# Patient Record
Sex: FEMALE | Race: WHITE | NOT HISPANIC OR LATINO | Employment: OTHER | URBAN - METROPOLITAN AREA
[De-identification: names, ages, dates, MRNs, and addresses within clinical notes are randomized per-mention and may not be internally consistent; named-entity substitution may affect disease eponyms.]

---

## 2022-06-29 ENCOUNTER — HOSPITAL ENCOUNTER (OUTPATIENT)
Facility: HOSPITAL | Age: 72
Setting detail: OBSERVATION
Discharge: HOME | End: 2022-07-01
Attending: EMERGENCY MEDICINE | Admitting: INTERNAL MEDICINE
Payer: COMMERCIAL

## 2022-06-29 ENCOUNTER — NURSE TRIAGE (OUTPATIENT)
Dept: NURSING | Facility: CLINIC | Age: 72
End: 2022-06-29

## 2022-06-29 ENCOUNTER — APPOINTMENT (OUTPATIENT)
Dept: CT IMAGING | Facility: HOSPITAL | Age: 72
End: 2022-06-29
Attending: EMERGENCY MEDICINE
Payer: COMMERCIAL

## 2022-06-29 ENCOUNTER — APPOINTMENT (OUTPATIENT)
Dept: RADIOLOGY | Facility: HOSPITAL | Age: 72
End: 2022-06-29
Attending: EMERGENCY MEDICINE
Payer: COMMERCIAL

## 2022-06-29 DIAGNOSIS — K21.00 GASTROESOPHAGEAL REFLUX DISEASE WITH ESOPHAGITIS, UNSPECIFIED WHETHER HEMORRHAGE: Primary | ICD-10-CM

## 2022-06-29 DIAGNOSIS — R06.00 DYSPNEA, UNSPECIFIED TYPE: ICD-10-CM

## 2022-06-29 DIAGNOSIS — R06.09 DOE (DYSPNEA ON EXERTION): ICD-10-CM

## 2022-06-29 DIAGNOSIS — I16.0 HYPERTENSIVE URGENCY: ICD-10-CM

## 2022-06-29 DIAGNOSIS — R06.01 ORTHOPNEA: ICD-10-CM

## 2022-06-29 LAB
ALBUMIN SERPL-MCNC: 3.5 G/DL (ref 3.5–5)
ALP SERPL-CCNC: 90 U/L (ref 45–120)
ALT SERPL W P-5'-P-CCNC: 49 U/L (ref 0–45)
ANION GAP SERPL CALCULATED.3IONS-SCNC: 10 MMOL/L (ref 5–18)
APTT PPP: 32 SECONDS (ref 22–38)
AST SERPL W P-5'-P-CCNC: 84 U/L (ref 0–40)
BASOPHILS # BLD AUTO: 0 10E3/UL (ref 0–0.2)
BASOPHILS NFR BLD AUTO: 1 %
BILIRUB SERPL-MCNC: 0.5 MG/DL (ref 0–1)
BNP SERPL-MCNC: 155 PG/ML (ref 0–117)
BUN SERPL-MCNC: 19 MG/DL (ref 8–22)
CALCIUM SERPL-MCNC: 8.8 MG/DL (ref 8.5–10.5)
CHLORIDE BLD-SCNC: 104 MMOL/L (ref 98–107)
CO2 SERPL-SCNC: 22 MMOL/L (ref 22–31)
CREAT SERPL-MCNC: 1.2 MG/DL (ref 0.6–1.1)
D DIMER PPP FEU-MCNC: 2.98 UG/ML FEU (ref 0–0.5)
EOSINOPHIL # BLD AUTO: 0 10E3/UL (ref 0–0.7)
EOSINOPHIL NFR BLD AUTO: 0 %
ERYTHROCYTE [DISTWIDTH] IN BLOOD BY AUTOMATED COUNT: 13 % (ref 10–15)
GFR SERPL CREATININE-BSD FRML MDRD: 49 ML/MIN/1.73M2
GLUCOSE BLD-MCNC: 108 MG/DL (ref 70–125)
HCT VFR BLD AUTO: 37.5 % (ref 35–47)
HGB BLD-MCNC: 12.6 G/DL (ref 11.7–15.7)
IMM GRANULOCYTES # BLD: 0 10E3/UL
IMM GRANULOCYTES NFR BLD: 0 %
INR PPP: 1.02 (ref 0.85–1.15)
LYMPHOCYTES # BLD AUTO: 0.8 10E3/UL (ref 0.8–5.3)
LYMPHOCYTES NFR BLD AUTO: 17 %
MCH RBC QN AUTO: 31.2 PG (ref 26.5–33)
MCHC RBC AUTO-ENTMCNC: 33.6 G/DL (ref 31.5–36.5)
MCV RBC AUTO: 93 FL (ref 78–100)
MONOCYTES # BLD AUTO: 0.3 10E3/UL (ref 0–1.3)
MONOCYTES NFR BLD AUTO: 7 %
NEUTROPHILS # BLD AUTO: 3.5 10E3/UL (ref 1.6–8.3)
NEUTROPHILS NFR BLD AUTO: 75 %
NRBC # BLD AUTO: 0 10E3/UL
NRBC BLD AUTO-RTO: 0 /100
PLATELET # BLD AUTO: 186 10E3/UL (ref 150–450)
POTASSIUM BLD-SCNC: 4 MMOL/L (ref 3.5–5)
PROT SERPL-MCNC: 7.2 G/DL (ref 6–8)
RBC # BLD AUTO: 4.04 10E6/UL (ref 3.8–5.2)
SARS-COV-2 RNA RESP QL NAA+PROBE: NEGATIVE
SODIUM SERPL-SCNC: 136 MMOL/L (ref 136–145)
TROPONIN I SERPL-MCNC: 0.01 NG/ML (ref 0–0.29)
WBC # BLD AUTO: 4.7 10E3/UL (ref 4–11)

## 2022-06-29 PROCEDURE — 84484 ASSAY OF TROPONIN QUANT: CPT | Performed by: EMERGENCY MEDICINE

## 2022-06-29 PROCEDURE — 80053 COMPREHEN METABOLIC PANEL: CPT | Performed by: EMERGENCY MEDICINE

## 2022-06-29 PROCEDURE — 85025 COMPLETE CBC W/AUTO DIFF WBC: CPT | Performed by: EMERGENCY MEDICINE

## 2022-06-29 PROCEDURE — 96375 TX/PRO/DX INJ NEW DRUG ADDON: CPT

## 2022-06-29 PROCEDURE — 93005 ELECTROCARDIOGRAM TRACING: CPT | Performed by: EMERGENCY MEDICINE

## 2022-06-29 PROCEDURE — C9803 HOPD COVID-19 SPEC COLLECT: HCPCS

## 2022-06-29 PROCEDURE — 71045 X-RAY EXAM CHEST 1 VIEW: CPT

## 2022-06-29 PROCEDURE — 85610 PROTHROMBIN TIME: CPT | Performed by: EMERGENCY MEDICINE

## 2022-06-29 PROCEDURE — 36415 COLL VENOUS BLD VENIPUNCTURE: CPT | Performed by: EMERGENCY MEDICINE

## 2022-06-29 PROCEDURE — 82040 ASSAY OF SERUM ALBUMIN: CPT | Performed by: EMERGENCY MEDICINE

## 2022-06-29 PROCEDURE — 250N000013 HC RX MED GY IP 250 OP 250 PS 637: Performed by: EMERGENCY MEDICINE

## 2022-06-29 PROCEDURE — 99285 EMERGENCY DEPT VISIT HI MDM: CPT | Mod: CS,25

## 2022-06-29 PROCEDURE — 85379 FIBRIN DEGRADATION QUANT: CPT | Performed by: EMERGENCY MEDICINE

## 2022-06-29 PROCEDURE — 83880 ASSAY OF NATRIURETIC PEPTIDE: CPT | Performed by: EMERGENCY MEDICINE

## 2022-06-29 PROCEDURE — 85730 THROMBOPLASTIN TIME PARTIAL: CPT | Performed by: EMERGENCY MEDICINE

## 2022-06-29 PROCEDURE — 96374 THER/PROPH/DIAG INJ IV PUSH: CPT

## 2022-06-29 PROCEDURE — 87635 SARS-COV-2 COVID-19 AMP PRB: CPT | Performed by: EMERGENCY MEDICINE

## 2022-06-29 PROCEDURE — 71275 CT ANGIOGRAPHY CHEST: CPT

## 2022-06-29 RX ORDER — NITROGLYCERIN 80 MG/1
1 PATCH TRANSDERMAL ONCE
Status: COMPLETED | OUTPATIENT
Start: 2022-06-29 | End: 2022-06-30

## 2022-06-29 RX ORDER — ASPIRIN 325 MG
325 TABLET ORAL ONCE
Status: COMPLETED | OUTPATIENT
Start: 2022-06-29 | End: 2022-06-29

## 2022-06-29 RX ADMIN — NITROGLYCERIN 1 PATCH: 0.4 PATCH TRANSDERMAL at 23:09

## 2022-06-29 RX ADMIN — ASPIRIN 325 MG: 325 TABLET ORAL at 23:06

## 2022-06-29 ASSESSMENT — ENCOUNTER SYMPTOMS
SHORTNESS OF BREATH: 1
NAUSEA: 1
ABDOMINAL PAIN: 1
ROS GI COMMENTS: POSITIVE FOR DRY HEAVING.

## 2022-06-29 NOTE — TELEPHONE ENCOUNTER
"Pt reports [facility] PCP is out of office and covering provider will not refill her pain medication. Tramadol for chronic leg pain. Pt also reports dental pain also and states \"my dentist won't give me anything\".     Pt is requesting to establish care with provider who was recommended to her by another pt.     Writer advised pt if she is having severe pain or it is an emergency she will need to be seen in ER.     Pt verbalizes understanding and agrees to plan.     Reason for Disposition    Requesting regular office appointment    Protocols used: INFORMATION ONLY CALL - NO TRIAGE-A-AH      "

## 2022-06-30 ENCOUNTER — APPOINTMENT (OUTPATIENT)
Dept: OCCUPATIONAL THERAPY | Facility: HOSPITAL | Age: 72
End: 2022-06-30
Attending: INTERNAL MEDICINE
Payer: COMMERCIAL

## 2022-06-30 ENCOUNTER — APPOINTMENT (OUTPATIENT)
Dept: CARDIOLOGY | Facility: HOSPITAL | Age: 72
End: 2022-06-30
Attending: INTERNAL MEDICINE
Payer: COMMERCIAL

## 2022-06-30 ENCOUNTER — APPOINTMENT (OUTPATIENT)
Dept: PHYSICAL THERAPY | Facility: HOSPITAL | Age: 72
End: 2022-06-30
Attending: INTERNAL MEDICINE
Payer: COMMERCIAL

## 2022-06-30 PROBLEM — I16.0 HYPERTENSIVE URGENCY: Status: ACTIVE | Noted: 2022-06-30

## 2022-06-30 PROBLEM — R06.09 DOE (DYSPNEA ON EXERTION): Status: ACTIVE | Noted: 2022-06-30

## 2022-06-30 PROBLEM — R06.00 DYSPNEA, UNSPECIFIED TYPE: Status: ACTIVE | Noted: 2022-06-30

## 2022-06-30 PROBLEM — R06.01 ORTHOPNEA: Status: ACTIVE | Noted: 2022-06-30

## 2022-06-30 LAB
ALBUMIN SERPL-MCNC: 3.1 G/DL (ref 3.5–5)
ALBUMIN UR-MCNC: 20 MG/DL
ALP SERPL-CCNC: 87 U/L (ref 45–120)
ALT SERPL W P-5'-P-CCNC: 48 U/L (ref 0–45)
ANION GAP SERPL CALCULATED.3IONS-SCNC: 7 MMOL/L (ref 5–18)
APPEARANCE UR: CLEAR
AST SERPL W P-5'-P-CCNC: 74 U/L (ref 0–40)
ATRIAL RATE - MUSE: 58 BPM
BILIRUB SERPL-MCNC: 0.4 MG/DL (ref 0–1)
BILIRUB UR QL STRIP: NEGATIVE
BUN SERPL-MCNC: 18 MG/DL (ref 8–22)
CALCIUM SERPL-MCNC: 8.5 MG/DL (ref 8.5–10.5)
CHLORIDE BLD-SCNC: 103 MMOL/L (ref 98–107)
CO2 SERPL-SCNC: 26 MMOL/L (ref 22–31)
COLOR UR AUTO: ABNORMAL
CREAT SERPL-MCNC: 1.18 MG/DL (ref 0.6–1.1)
DIASTOLIC BLOOD PRESSURE - MUSE: NORMAL MMHG
ERYTHROCYTE [DISTWIDTH] IN BLOOD BY AUTOMATED COUNT: 13 % (ref 10–15)
GFR SERPL CREATININE-BSD FRML MDRD: 49 ML/MIN/1.73M2
GLUCOSE BLD-MCNC: 127 MG/DL (ref 70–125)
GLUCOSE UR STRIP-MCNC: NEGATIVE MG/DL
HCT VFR BLD AUTO: 35.9 % (ref 35–47)
HGB BLD-MCNC: 11.7 G/DL (ref 11.7–15.7)
HGB UR QL STRIP: NEGATIVE
INTERPRETATION ECG - MUSE: NORMAL
KETONES UR STRIP-MCNC: NEGATIVE MG/DL
LACTATE SERPL-SCNC: 1 MMOL/L (ref 0.7–2)
LEUKOCYTE ESTERASE UR QL STRIP: NEGATIVE
LVEF ECHO: NORMAL
MCH RBC QN AUTO: 31 PG (ref 26.5–33)
MCHC RBC AUTO-ENTMCNC: 32.6 G/DL (ref 31.5–36.5)
MCV RBC AUTO: 95 FL (ref 78–100)
MUCOUS THREADS #/AREA URNS LPF: PRESENT /LPF
NITRATE UR QL: NEGATIVE
P AXIS - MUSE: 37 DEGREES
PH UR STRIP: 5 [PH] (ref 5–7)
PLATELET # BLD AUTO: 175 10E3/UL (ref 150–450)
POTASSIUM BLD-SCNC: 4 MMOL/L (ref 3.5–5)
PR INTERVAL - MUSE: 144 MS
PROT SERPL-MCNC: 6.8 G/DL (ref 6–8)
QRS DURATION - MUSE: 72 MS
QT - MUSE: 408 MS
QTC - MUSE: 400 MS
R AXIS - MUSE: 12 DEGREES
RBC # BLD AUTO: 3.77 10E6/UL (ref 3.8–5.2)
RBC URINE: 0 /HPF
SODIUM SERPL-SCNC: 136 MMOL/L (ref 136–145)
SP GR UR STRIP: 1.03 (ref 1–1.03)
SQUAMOUS EPITHELIAL: 1 /HPF
SYSTOLIC BLOOD PRESSURE - MUSE: NORMAL MMHG
T AXIS - MUSE: 56 DEGREES
TROPONIN I SERPL-MCNC: <0.01 NG/ML (ref 0–0.29)
TROPONIN I SERPL-MCNC: <0.01 NG/ML (ref 0–0.29)
UROBILINOGEN UR STRIP-MCNC: <2 MG/DL
VENTRICULAR RATE- MUSE: 58 BPM
WBC # BLD AUTO: 3.9 10E3/UL (ref 4–11)
WBC URINE: 0 /HPF

## 2022-06-30 PROCEDURE — 255N000002 HC RX 255 OP 636: Performed by: INTERNAL MEDICINE

## 2022-06-30 PROCEDURE — 84484 ASSAY OF TROPONIN QUANT: CPT | Performed by: INTERNAL MEDICINE

## 2022-06-30 PROCEDURE — 97162 PT EVAL MOD COMPLEX 30 MIN: CPT | Mod: GP | Performed by: PHYSICAL THERAPIST

## 2022-06-30 PROCEDURE — 250N000011 HC RX IP 250 OP 636: Performed by: INTERNAL MEDICINE

## 2022-06-30 PROCEDURE — 81001 URINALYSIS AUTO W/SCOPE: CPT | Performed by: INTERNAL MEDICINE

## 2022-06-30 PROCEDURE — 93306 TTE W/DOPPLER COMPLETE: CPT | Mod: 26 | Performed by: INTERNAL MEDICINE

## 2022-06-30 PROCEDURE — 97166 OT EVAL MOD COMPLEX 45 MIN: CPT | Mod: GO

## 2022-06-30 PROCEDURE — 250N000013 HC RX MED GY IP 250 OP 250 PS 637: Performed by: INTERNAL MEDICINE

## 2022-06-30 PROCEDURE — G0378 HOSPITAL OBSERVATION PER HR: HCPCS

## 2022-06-30 PROCEDURE — 250N000013 HC RX MED GY IP 250 OP 250 PS 637: Performed by: EMERGENCY MEDICINE

## 2022-06-30 PROCEDURE — 85027 COMPLETE CBC AUTOMATED: CPT | Performed by: INTERNAL MEDICINE

## 2022-06-30 PROCEDURE — 250N000011 HC RX IP 250 OP 636: Performed by: EMERGENCY MEDICINE

## 2022-06-30 PROCEDURE — 97535 SELF CARE MNGMENT TRAINING: CPT | Mod: GO

## 2022-06-30 PROCEDURE — 999N000208 ECHOCARDIOGRAM COMPLETE

## 2022-06-30 PROCEDURE — 83605 ASSAY OF LACTIC ACID: CPT | Performed by: INTERNAL MEDICINE

## 2022-06-30 PROCEDURE — 36415 COLL VENOUS BLD VENIPUNCTURE: CPT | Performed by: INTERNAL MEDICINE

## 2022-06-30 PROCEDURE — 99220 PR INITIAL OBSERVATION CARE,LEVEL III: CPT | Performed by: INTERNAL MEDICINE

## 2022-06-30 PROCEDURE — C9113 INJ PANTOPRAZOLE SODIUM, VIA: HCPCS | Performed by: INTERNAL MEDICINE

## 2022-06-30 PROCEDURE — 97116 GAIT TRAINING THERAPY: CPT | Mod: GP | Performed by: PHYSICAL THERAPIST

## 2022-06-30 PROCEDURE — 80053 COMPREHEN METABOLIC PANEL: CPT | Performed by: INTERNAL MEDICINE

## 2022-06-30 RX ORDER — HYDRALAZINE HYDROCHLORIDE 20 MG/ML
10 INJECTION INTRAMUSCULAR; INTRAVENOUS EVERY 6 HOURS PRN
Status: DISCONTINUED | OUTPATIENT
Start: 2022-06-30 | End: 2022-07-01 | Stop reason: HOSPADM

## 2022-06-30 RX ORDER — AMOXICILLIN 250 MG
1-2 CAPSULE ORAL 2 TIMES DAILY PRN
Status: DISCONTINUED | OUTPATIENT
Start: 2022-06-30 | End: 2022-07-01 | Stop reason: HOSPADM

## 2022-06-30 RX ORDER — LISINOPRIL 5 MG/1
10 TABLET ORAL DAILY
Status: DISCONTINUED | OUTPATIENT
Start: 2022-06-30 | End: 2022-07-01 | Stop reason: HOSPADM

## 2022-06-30 RX ORDER — ATENOLOL 25 MG/1
25 TABLET ORAL DAILY
COMMUNITY

## 2022-06-30 RX ORDER — FUROSEMIDE 10 MG/ML
40 INJECTION INTRAMUSCULAR; INTRAVENOUS ONCE
Status: COMPLETED | OUTPATIENT
Start: 2022-06-30 | End: 2022-06-30

## 2022-06-30 RX ORDER — LIDOCAINE 40 MG/G
CREAM TOPICAL
Status: DISCONTINUED | OUTPATIENT
Start: 2022-06-30 | End: 2022-07-01 | Stop reason: HOSPADM

## 2022-06-30 RX ORDER — IOPAMIDOL 755 MG/ML
75 INJECTION, SOLUTION INTRAVASCULAR ONCE
Status: COMPLETED | OUTPATIENT
Start: 2022-06-30 | End: 2022-06-30

## 2022-06-30 RX ORDER — ATENOLOL 25 MG/1
25 TABLET ORAL DAILY
Status: DISCONTINUED | OUTPATIENT
Start: 2022-06-30 | End: 2022-07-01 | Stop reason: HOSPADM

## 2022-06-30 RX ORDER — ACETAMINOPHEN 325 MG/1
650 TABLET ORAL ONCE
Status: COMPLETED | OUTPATIENT
Start: 2022-06-30 | End: 2022-06-30

## 2022-06-30 RX ORDER — GABAPENTIN 300 MG/1
300 CAPSULE ORAL
Status: DISCONTINUED | OUTPATIENT
Start: 2022-06-30 | End: 2022-07-01 | Stop reason: HOSPADM

## 2022-06-30 RX ORDER — PANTOPRAZOLE SODIUM 40 MG/1
40 TABLET, DELAYED RELEASE ORAL
Status: DISCONTINUED | OUTPATIENT
Start: 2022-07-01 | End: 2022-07-01 | Stop reason: HOSPADM

## 2022-06-30 RX ORDER — TRAMADOL HYDROCHLORIDE 50 MG/1
50 TABLET ORAL EVERY 6 HOURS PRN
Status: DISCONTINUED | OUTPATIENT
Start: 2022-06-30 | End: 2022-07-01 | Stop reason: HOSPADM

## 2022-06-30 RX ORDER — TRAMADOL HYDROCHLORIDE 50 MG/1
50 TABLET ORAL EVERY 6 HOURS PRN
COMMUNITY

## 2022-06-30 RX ORDER — ONDANSETRON 2 MG/ML
4 INJECTION INTRAMUSCULAR; INTRAVENOUS EVERY 6 HOURS PRN
Status: DISCONTINUED | OUTPATIENT
Start: 2022-06-30 | End: 2022-07-01 | Stop reason: HOSPADM

## 2022-06-30 RX ORDER — LISINOPRIL 10 MG/1
10 TABLET ORAL DAILY
COMMUNITY

## 2022-06-30 RX ORDER — SIMVASTATIN 10 MG
20 TABLET ORAL AT BEDTIME
Status: DISCONTINUED | OUTPATIENT
Start: 2022-06-30 | End: 2022-07-01 | Stop reason: HOSPADM

## 2022-06-30 RX ADMIN — PANTOPRAZOLE SODIUM 40 MG: 40 INJECTION, POWDER, FOR SOLUTION INTRAVENOUS at 11:08

## 2022-06-30 RX ADMIN — SIMVASTATIN 20 MG: 10 TABLET, FILM COATED ORAL at 20:28

## 2022-06-30 RX ADMIN — ACETAMINOPHEN 650 MG: 325 TABLET ORAL at 01:09

## 2022-06-30 RX ADMIN — LISINOPRIL 10 MG: 5 TABLET ORAL at 14:04

## 2022-06-30 RX ADMIN — FUROSEMIDE 40 MG: 10 INJECTION, SOLUTION INTRAMUSCULAR; INTRAVENOUS at 00:42

## 2022-06-30 RX ADMIN — PERFLUTREN 2 ML: 6.52 INJECTION, SUSPENSION INTRAVENOUS at 09:59

## 2022-06-30 RX ADMIN — TRAMADOL HYDROCHLORIDE 50 MG: 50 TABLET ORAL at 23:54

## 2022-06-30 RX ADMIN — TRAMADOL HYDROCHLORIDE 25 MG: 50 TABLET ORAL at 02:37

## 2022-06-30 RX ADMIN — IOPAMIDOL 75 ML: 755 INJECTION, SOLUTION INTRAVENOUS at 00:12

## 2022-06-30 RX ADMIN — ATENOLOL 25 MG: 25 TABLET ORAL at 14:04

## 2022-06-30 RX ADMIN — TRAMADOL HYDROCHLORIDE 50 MG: 50 TABLET ORAL at 13:11

## 2022-06-30 ASSESSMENT — ACTIVITIES OF DAILY LIVING (ADL)
DIFFICULTY_COMMUNICATING: NO
DOING_ERRANDS_INDEPENDENTLY_DIFFICULTY: YES
WALKING_OR_CLIMBING_STAIRS_DIFFICULTY: YES
DIFFICULTY_EATING/SWALLOWING: NO
WALKING_OR_CLIMBING_STAIRS: STAIR CLIMBING DIFFICULTY, REQUIRES EQUIPMENT
EQUIPMENT_CURRENTLY_USED_AT_HOME: CANE, STRAIGHT
ADLS_ACUITY_SCORE: 35
CHANGE_IN_FUNCTIONAL_STATUS_SINCE_ONSET_OF_CURRENT_ILLNESS/INJURY: YES
IADL_COMMENTS: IND FOR IADLS, DRIVES, MEDS
TRANSFERRING: 0-->INDEPENDENT
CONCENTRATING,_REMEMBERING_OR_MAKING_DECISIONS_DIFFICULTY: NO
ADLS_ACUITY_SCORE: 35
DRESSING/BATHING_DIFFICULTY: NO
DEPENDENT_IADLS:: INDEPENDENT
FALL_HISTORY_WITHIN_LAST_SIX_MONTHS: YES
TRANSFERRING: 0-->INDEPENDENT
ADLS_ACUITY_SCORE: 35
ADLS_ACUITY_SCORE: 35
TOILETING_ISSUES: NO
WEAR_GLASSES_OR_BLIND: YES
HEARING_DIFFICULTY_OR_DEAF: NO
ADLS_ACUITY_SCORE: 35
NUMBER_OF_TIMES_PATIENT_HAS_FALLEN_WITHIN_LAST_SIX_MONTHS: 2

## 2022-06-30 NOTE — ED NOTES
Finished 3/4 of food ordered for breakfast.  Dr. Lynn informed of pt's chronic right leg pain and awaiting for med orders

## 2022-06-30 NOTE — PLAN OF CARE
"  Problem: Plan of Care - These are the overarching goals to be used throughout the patient stay.    Goal: Plan of Care Review/Shift Note  Description: The Plan of Care Review/Shift note should be completed every shift.  The Outcome Evaluation is a brief statement about your assessment that the patient is improving, declining, or no change.  This information will be displayed automatically on your shift note.  Outcome: Ongoing, Progressing  Goal: Patient-Specific Goal (Individualized)  Description: You can add care plan individualizations to a care plan. Examples of Individualization might be:  \"Parent requests to be called daily at 9am for status\", \"I have a hard time hearing out of my right ear\", or \"Do not touch me to wake me up as it startles me\".  Outcome: Ongoing, Progressing  Goal: Absence of Hospital-Acquired Illness or Injury  Outcome: Ongoing, Progressing  Intervention: Identify and Manage Fall Risk  Recent Flowsheet Documentation  Taken 6/30/2022 1659 by Christie Zhou, RN  Safety Promotion/Fall Prevention:    room door open    lighting adjusted    mobility aid in reach    nonskid shoes/slippers when out of bed  Intervention: Prevent and Manage VTE (Venous Thromboembolism) Risk  Recent Flowsheet Documentation  Taken 6/30/2022 1659 by Christie Zhou, RN  Activity Management:    activity adjusted per tolerance    activity encouraged    bedrest    up ad thelma  Goal: Optimal Comfort and Wellbeing  Outcome: Ongoing, Progressing     Problem: Risk for Delirium  Goal: Optimal Coping  Outcome: Ongoing, Progressing  Goal: Improved Attention and Thought Clarity  Outcome: Ongoing, Progressing   Goal Outcome Evaluation:  Transfer in from the Emergency room  with complaints taken as SOB with activity  noted to have 3+ lower extremity edema ,lung sounds with crackles  severe yeast infection of the traci area, Placed on the telemetry and reading showed NSR.                    "

## 2022-06-30 NOTE — CONSULTS
Care Management Initial Consult    General Information  Assessment completed with: Patient, pt  Type of CM/SW Visit: Initial Assessment    Primary Care Provider verified and updated as needed: Yes   Readmission within the last 30 days: no previous admission in last 30 days   Return Category: Progression of disease  Reason for Consult: discharge planning  Advance Care Planning: Advance Care Planning Reviewed: no concerns identified     General Information Comments: lives alone in [facility] and drove here, no family around but has supportive friends    Communication Assessment  Patient's communication style: spoken language (English or Bilingual)             Cognitive  Cognitive/Neuro/Behavioral: WDL                      Living Environment:   People in home: alone     Current living Arrangements: other (see comments) ([facility])      Able to return to prior arrangements: yes       Family/Social Support:  Care provided by: self  Provides care for: no one  Marital Status: Single  Other (specify) ([facility])          Description of Support System: Supportive, Involved    Support Assessment: Adequate social supports, Adequate family and caregiver support    Current Resources:   Patient receiving home care services: No     Community Resources: DME  Equipment currently used at home: cane, straight, shower chair  Supplies currently used at home: Other (glasses)    Employment/Financial:  Employment Status:          Financial Concerns: No concerns identified   Referral to Financial Worker: No       Lifestyle & Psychosocial Needs:  Social Determinants of Health     Tobacco Use: Unknown     Smoking Tobacco Use: Never Smoker     Smokeless Tobacco Use: Unknown   Alcohol Use: Not on file   Financial Resource Strain: Not on file   Food Insecurity: Not on file   Transportation Needs: Not on file   Physical Activity: Not on file   Stress: Not on file   Social Connections: Not on file   Intimate Partner Violence: Not on file   Depression:  Not on file   Housing Stability: Not on file       Functional Status:  Prior to admission patient needed assistance:   Dependent ADLs:: Independent  Dependent IADLs:: Independent  Assesssment of Functional Status: Not at baseline with ADL Functioning    Mental Health Status:  Mental Health Status: No Current Concerns       Chemical Dependency Status:                Values/Beliefs:  Spiritual, Cultural Beliefs, Latter-day Practices, Values that affect care:                 Additional Information:  Assessed, lives alone in [facility] and drove here, no family around but has supportive friends. Discussed MOON/[email or web address].       Inocente Owens RN

## 2022-06-30 NOTE — ED PROVIDER NOTES
NAME: Bonnie Hill  AGE: 71 year old female  YOB: 1950  MRN: [id number]  EVALUATION DATE & TIME: 6/29/2022 10:08 PM    PCP: No primary care provider on file.    ED PROVIDER: Mati Schaeffer M.D.      Chief Complaint   Patient presents with     Shortness of Breath     Abdominal Pain     FINAL IMPRESSION:  1. BURROUGHS (dyspnea on exertion)    2. Orthopnea    3. Dyspnea, unspecified type    4. Hypertensive urgency      MEDICAL DECISION MAKING:    10:27 PM I met with the patient, obtained history, performed an initial exam, and discussed options and plan for diagnostics and treatment here in the ED.   12:45 AM Checked in on and updated patient.   12:57 AM Informed by nurse that patient is now endorsing dental pain and would like Tylenol for this. Will place an order.   1:07 AM Spoke with the hospitalist, Dr. Stevens.      Patient was clinically assessed and consented to treatment. After assessment, medical decision making and workup were discussed with the patient. The patient was agreeable to plan for testing, workup, and treatment.  Pertinent Labs & Imaging studies reviewed. (See chart for details)     Bonnie Hill is a 71 year old female who presents with breath and abdominal pain.   Differential diagnosis includes but not limited to CHF exacerbation, pulm embolism, acute coronary syndrome, pneumonia, pulmonary hypertension, hypertensive emergency, hypertensive urgency.  Patient with ongoing progressive worsening dyspnea on exertion, orthopnea, and bilateral crackles suspicious for pulmonary edema and likely CHF.  Patient does have high blood pressure that is poorly controlled and likely some component of hypertensive urgency causing some cardiac strain.  EKG showed no ST elevation ischemia.  Troponin was negative and BNP slightly elevated.  Patient's metabolic panel and CBC unremarkable and D-dimer did return slightly elevated.  Patient would be concern for PE given the shortness of breath  however given the slow progression I suspect more so pulmonary edema and will proceed with CTA for rule out.  Chest x-ray did show possible infiltrate but unlikely pneumonia as this is been slowly progressive and I feel more so that is probably atelectasis and pulmonary edema.  CTA was negative for pulmonary embolism.  Patient given Lasix IV and nitro patch placed for blood pressure control.  Patient's blood pressure did come down from 200s to 170s where she states she normally is at with her home blood pressure medications.  Patient will require better blood pressure control as well as cardiac rule out and evaluation for suspected CHF.  Patient was placed on cardiac telemetry and was discussed with hospitalist for admission.    0 minutes of critical care time    MEDICATIONS GIVEN IN THE EMERGENCY:  Medications   nitroGLYcerin (NITRODUR) 0.4 MG/HR 24 hr patch 1 patch (1 patch Transdermal Patch/Med Applied 6/29/22 2309)   aspirin (ASA) tablet 325 mg (325 mg Oral Given 6/29/22 2306)   iopamidol (ISOVUE-370) solution 75 mL (75 mLs Intravenous Given 6/30/22 0012)   furosemide (LASIX) injection 40 mg (40 mg Intravenous Given 6/30/22 0042)   acetaminophen (TYLENOL) tablet 650 mg (650 mg Oral Given 6/30/22 0109)   traMADol (ULTRAM) half-tab 25 mg (25 mg Oral Given 6/30/22 0237)       NEW PRESCRIPTIONS STARTED AT TODAY'S ER VISIT:  New Prescriptions    No medications on file          =================================================================    HPI    Patient information was obtained from: patient     Use of : N/A       Bonniececy Hill is a 71 year old female with a past medical history of hypertension, hyperlipidemia, and lymphedema who presents for shortness of breath.     Per triage note, patient presented for 2 days (6/27/2022) of nausea, dry heaving, and abdominal pain.     Per patient, she has felt exertional shortness of breath for the past couple years. States she cannot walk 50 feet without  feeling short of breath. Reports she feels better sitting up than laying down. She endorse having chronic lower extremity edema. She reports a history of hypertension and is taking 2 blood pressure medications which she takes regularly. States her normal blood pressure is 175. Denies seeing her primary care provider for her shortness of breath. Patient denies chest pain or any other complaints at this time.     REVIEW OF SYSTEMS   Review of Systems   Respiratory: Positive for shortness of breath.    Cardiovascular: Positive for leg swelling (bilateral, chronic). Negative for chest pain.   Gastrointestinal: Positive for abdominal pain and nausea.        Positive for dry heaving.   All other systems reviewed and are negative.       PAST MEDICAL HISTORY:  No past medical history on file.    PAST SURGICAL HISTORY:  Past Surgical History:   Procedure Laterality Date     OTHER SURGICAL HISTORY  2012    left TKArevision in 2013     RELEASE CARPAL TUNNEL Bilateral 1990     RIGHT OOPHORECTOMY         CURRENT MEDICATIONS:      Current Facility-Administered Medications:      nitroGLYcerin (NITRODUR) 0.4 MG/HR 24 hr patch 1 patch, 1 patch, Transdermal, Once, WallMati MD, 1 patch at 06/29/22 8966    Current Outpatient Medications:      capsicum (ZOSTRIX) 0.075 % topical cream, [CAPSICUM (ZOSTRIX) 0.075 % TOPICAL CREAM] Apply 1 application topically 2 (two) times a day as needed., Disp: , Rfl:      gabapentin (NEURONTIN) 300 MG capsule, [GABAPENTIN (NEURONTIN) 300 MG CAPSULE] Take 300 mg by mouth bedtime as needed., Disp: , Rfl:      MULTIVITAMIN (MULTIPLE VITAMINS ORAL), [MULTIVITAMIN (MULTIPLE VITAMINS ORAL)] Take by mouth. 1 packet of pills daily po, Disp: , Rfl:      nystatin (MYCOSTATIN) powder, [NYSTATIN (MYCOSTATIN) POWDER] Apply topically 2 (two) times a day as needed. , Disp: , Rfl:      simvastatin (ZOCOR) 20 MG tablet, [SIMVASTATIN (ZOCOR) 20 MG TABLET] Take 20 mg by mouth bedtime., Disp: , Rfl:  "    ALLERGIES:  No Known Allergies    FAMILY HISTORY:  No family history on file.    SOCIAL HISTORY:   Social History     Socioeconomic History     Marital status: Single   Tobacco Use     Smoking status: Never Smoker   Substance and Sexual Activity     Alcohol use: Yes     Comment: Alcoholic Drinks/day: rarely     Drug use: No       PHYSICAL EXAM:    Vitals: /63   Pulse 78   Temp 98.6  F (37  C) (Oral)   Resp 25   Ht 1.448 m (4' 9\")   Wt 98.9 kg (218 lb)   SpO2 94%   BMI 47.17 kg/m     Physical Exam  Vitals and nursing note reviewed.   Constitutional:       General: She is not in acute distress.     Appearance: She is well-developed and normal weight. She is not ill-appearing or toxic-appearing.      Interventions: She is not intubated.  HENT:      Head: Normocephalic.   Cardiovascular:      Rate and Rhythm: Normal rate and regular rhythm.  No extrasystoles are present.     Pulses: Normal pulses.      Heart sounds: Normal heart sounds.   Pulmonary:      Effort: Tachypnea and accessory muscle usage present. No bradypnea or respiratory distress. She is not intubated.      Breath sounds: No stridor. Examination of the right-lower field reveals decreased breath sounds and rales. Examination of the left-lower field reveals decreased breath sounds and rales. Decreased breath sounds and rales present. No wheezing or rhonchi.   Chest:      Chest wall: No tenderness.   Musculoskeletal:      Cervical back: Normal range of motion.      Right lower leg: No tenderness. Edema present.      Left lower leg: No tenderness. Edema present.   Skin:     General: Skin is warm and dry.      Capillary Refill: Capillary refill takes less than 2 seconds.      Findings: No erythema.   Neurological:      General: No focal deficit present.      Mental Status: She is alert.   Psychiatric:         Behavior: Behavior normal.           LAB:  All pertinent labs reviewed and interpreted.  Labs Ordered and Resulted from Time of ED " Arrival to Time of ED Departure   D DIMER QUANTITATIVE - Abnormal       Result Value    D-Dimer Quantitative 2.98 (*)    COMPREHENSIVE METABOLIC PANEL - Abnormal    Sodium 136      Potassium 4.0      Chloride 104      Carbon Dioxide (CO2) 22      Anion Gap 10      Urea Nitrogen 19      Creatinine 1.20 (*)     Calcium 8.8      Glucose 108      Alkaline Phosphatase 90      AST 84 (*)     ALT 49 (*)     Protein Total 7.2      Albumin 3.5      Bilirubin Total 0.5      GFR Estimate 49 (*)    B-TYPE NATRIURETIC PEPTIDE ( EAST ONLY) - Abnormal     (*)    INR - Normal    INR 1.02     PARTIAL THROMBOPLASTIN TIME - Normal    aPTT 32     TROPONIN I - Normal    Troponin I 0.01     COVID-19 VIRUS (CORONAVIRUS) BY PCR - Normal    SARS CoV2 PCR Negative     CBC WITH PLATELETS AND DIFFERENTIAL    WBC Count 4.7      RBC Count 4.04      Hemoglobin 12.6      Hematocrit 37.5      MCV 93      MCH 31.2      MCHC 33.6      RDW 13.0      Platelet Count 186      % Neutrophils 75      % Lymphocytes 17      % Monocytes 7      % Eosinophils 0      % Basophils 1      % Immature Granulocytes 0      NRBCs per 100 WBC 0      Absolute Neutrophils 3.5      Absolute Lymphocytes 0.8      Absolute Monocytes 0.3      Absolute Eosinophils 0.0      Absolute Basophils 0.0      Absolute Immature Granulocytes 0.0      Absolute NRBCs 0.0         RADIOLOGY:  CT Chest Pulmonary Embolism w Contrast   Final Result   IMPRESSION:   1.  No pulmonary emboli on either side. Benign nodule in each upper lobe, no specific follow-up required. Platelike atelectasis in the lower lungs, improved on the left. No adenopathy or effusion.      2.  Normal cardiac size. Moderate coronary artery calcifications. No pericardial effusion. Normal caliber thoracic aorta without aneurysm or dissection.      3.  Degenerative changes both shoulders and the spine.      XR Chest Port 1 View   Final Result   IMPRESSION: Small right basilar infiltrate may be pneumonia.        EKG:    Performed at: 10:45 PM on June 29, 2022.  Impression: Sinus pericardia with sinus arrhythmia, no signs of acute ST elevation ischemia or irregular/fatal arrhythmia.  Rate: 58 bpm  Rhythm: Sinus bradycardia with sinus arrhythmia  QRS Interval: 72 ms  QTc Interval: 400 ms  Comparison: No old EKG for comparison  I have independently reviewed and interpreted the EKG(s) documented above.     PROCEDURES:   Procedures       I, Claire Guerin, am serving as a scribe to document services personally performed by Dr. Mati Schaeffer  based on my observation and the provider's statements to me. I, Mati Schaeffer MD attest that Claire Guerin is acting in a scribe capacity, has observed my performance of the services and has documented them in accordance with my direction.      Mati Schaeffer M.D.  Emergency Medicine  [facility] Emergency Department     Mati Schaeffer MD  06/30/22 5062

## 2022-06-30 NOTE — PLAN OF CARE
Occupational Therapy Discharge Summary    Reason for therapy discharge:    All goals and outcomes met, no further needs identified.    Progress towards therapy goal(s). See goals on Care Plan in [facility] electronic health record for goal details.  Goals met    Therapy recommendation(s):    No further therapy is recommended. Rec to continue physical therapy to address remaining activity tolerance deficits.

## 2022-06-30 NOTE — ED NOTES
Pt cleared by OT from their end.  Nuc Med Stress test scheduled tomorrow and  for pt at 745 AM.  NPOp MN and No Caffeine after 1900 today.  Pt can have meds with sips of water tomorrow.  Report given to MAI Pierson

## 2022-06-30 NOTE — UTILIZATION REVIEW
"Admission Status; Secondary Review Determination     Under the authority of the Utilization Management Committee, the utilization review process indicated a secondary review on Bonnie Hill.  The review outcome is based on review of the medical records, discussions with staff, and applying clinical experience noted on the date of the review.     (x) Observation Status Appropriate - This patient does not meet hospital inpatient criteria and is placed in observation status. If this patient's primary payer is Medicare and was admitted as an inpatient, Condition Code 44 should be used and patient status changed to \"observation\".     RATIONALE FOR DETERMINATION   72 yr old female presented with acute on chronic dyspnea with chronic lymphedema and worsening chronic exertional dyspnea with chest pain.  Stress test planned.  Some hypertensive urgency but hadn't taken meds in a day.  Discussed with Dr. Lynn---staying for stress test today.  Lasix IV x 1 only.  Anticipating discharge in AM.    The severity of illness, intensity of service provided, expected LOS and risk for adverse outcome make the care appropriate for further observation; however, doesn't meet criteria for hospital inpatient admission. Dr Lynn notified of this determination and agrees with downgrade of status.      The information on this document is developed by the utilization review team in order for the business office to ensure compliance.  This only denotes the appropriateness of proper admission status and does not reflect the quality of care rendered.         The definitions of Inpatient Status and Observation Status used in making the determination above are those provided in the CMS Coverage Manual, Chapter 1 and Chapter 6, section 70.4.      Sincerely,  Rosario Manzanares MD  Utilization Review  Physician Advisor  [facility]    "

## 2022-06-30 NOTE — PHARMACY-ADMISSION MEDICATION HISTORY
Pharmacy Note - Admission Medication History    Pertinent Provider Information:       ______________________________________________________________________    Prior To Admission (PTA) med list completed and updated in EMR.       PTA Med List   Medication Sig Last Dose     atenolol (TENORMIN) 25 MG tablet Take 25 mg by mouth daily 6/28/2022     capsicum (ZOSTRIX) 0.075 % topical cream [CAPSICUM (ZOSTRIX) 0.075 % TOPICAL CREAM] Apply 1 application topically 2 (two) times a day as needed. prn     gabapentin (NEURONTIN) 300 MG capsule [GABAPENTIN (NEURONTIN) 300 MG CAPSULE] Take 300 mg by mouth bedtime as needed. prn     lisinopril (ZESTRIL) 10 MG tablet Take 10 mg by mouth daily 6/28/2022     MULTIVITAMIN (MULTIPLE VITAMINS ORAL) [MULTIVITAMIN (MULTIPLE VITAMINS ORAL)] Take by mouth. 1 packet of pills daily po 6/29/2022 at Unknown time     nystatin (MYCOSTATIN) powder [NYSTATIN (MYCOSTATIN) POWDER] Apply topically 2 (two) times a day as needed.  prn     traMADol (ULTRAM) 50 MG tablet Take 50 mg by mouth every 6 hours as needed for severe pain 6/26/2022       [facility]  Method of interview communication: in-person    Summary of Changes to PTA Med List  New:  Atenolol, Lisinopril & Tramadol.  Discontinued:  None  Changed:  None    Patient was asked about OTC/herbal products specifically.  PTA med list reflects this.    In the past week, patient estimated taking medication this percent of the time:  greater than 90%.    Allergies were reviewed, assessed, and updated with the patient.      Patient does not use any multi-dose medications prior to admission.    The information provided in this note is only as accurate as the sources available at the time of the update(s).    Thank you for the opportunity to participate in the care of this patient.    Gloria Greenwood RPH  6/30/2022 10:46 AM

## 2022-06-30 NOTE — PROGRESS NOTES
"   06/30/22 1443   Living Environment   People in Home alone   Current Living Arrangements other (see comments)  (town house)   Living Environment Comments tub shower, high toilet   Self-Care   Equipment Currently Used at Home cane, straight;shower chair  (uses carts at stores for support)   Fall history within last six months yes   Number of times patient has fallen within last six months 2  (lost balance, shoe got caught)   Activity/Exercise/Self-Care Comment ind for BADLs   General Information   Onset of Illness/Injury or Date of Surgery 06/30/22   Referring Physician Matthew Lynn DO   Patient/Family Therapy Goals Statement (PT) None stated.   Pertinent History of Current Problem (include personal factors and/or comorbidities that impact the POC) Per H&P: \"71 year old female with a past medical history of hypertension, hyperlipidemia, and lymphedema who presents for shortness of breath.\"   Existing Precautions/Restrictions fall;oxygen therapy device and L/min  (1L O2)   Range of Motion (ROM)   Range of Motion ROM is WFL   Strength (Manual Muscle Testing)   Strength (Manual Muscle Testing) Deficits observed during functional mobility   Bed Mobility   Bed Mobility supine-sit;sit-supine   Supine-Sit [place] (Bed Mobility) supervision   Sit-Supine [place] (Bed Mobility) supervision   Impairments Contributing to Impaired Bed Mobility decreased strength   Transfers   Transfers sit-stand transfer   Impairments Contributing to Impaired Transfers decreased strength;impaired balance   Sit-Stand Transfer   Sit-Stand [place] (Transfers) contact guard;verbal cues   Assistive Device (Sit-Stand Transfers) walker, 4-wheeled   Gait/Stairs (Locomotion)   [place] Level (Gait) contact guard;verbal cues   Assistive Device (Gait) walker, 4-wheeled   Distance in Feet (Required for LE Total Joints) 30'   Pattern (Gait) step-through   Deviations/Abnormal Patterns (Gait) angélica decreased;gait speed " decreased  (wide CUATE, lateral sway)   Clinical Impression   Criteria for Skilled Therapeutic Intervention Yes, treatment indicated   PT Diagnosis (PT) impaired functional mobility   Influenced by the following impairments decreased strength, impaired balance,  decreased endurance   Functional limitations due to impairments transfers, ambulation   Clinical Presentation (PT Evaluation Complexity) Evolving/Changing   Clinical Presentation Rationale Pt presents as medically diagnosed.   Clinical Decision Making (Complexity) moderate complexity   Planned Therapy Interventions (PT) balance training;bed mobility training;gait training;home exercise program;neuromuscular re-education;patient/family education;strengthening;transfer training   Anticipated Equipment Needs at Discharge (PT) other (see comments)  (TBD, may benefit from walker)   Risk & Benefits of therapy have been explained evaluation/treatment results reviewed;participants voiced agreement with care plan;participants included;patient   PT Discharge Planning   PT Discharge Recommendation (DC Rec) home with assist;home with home care physical therapy   PT Rationale for DC Rec Pt moving well with stand-by to contact guard assist for mobility.   Total Evaluation Time   Total Evaluation Time (Minutes) 10   Physical Therapy Goals   PT Frequency Daily   PT Predicted Duration/Target Date for Goal Attainment 07/07/22   PT Goals Bed Mobility;Transfers;Gait;Stairs   PT: Bed Mobility Supervision/stand-by assist;Supine to/from sit   PT: Transfers Supervision/stand-by assist;Sit to/from stand;Assistive device   PT: Gait Supervision/stand-by assist;Assistive device;Straight cane;150 feet     Yaquelin Lewis, PT  6/30/2022

## 2022-06-30 NOTE — PROGRESS NOTES
Called and spoke with pt's RN, Nasreen, regarding NM stress test tomorrow (7/1) AM.    Pt is scheduled to have NM Stress Test at 0745.  Instructed NO caffeine after 7pm this evening and NPO after midnight except pt may have medications with a glass of water.    Karla Enamorado RN

## 2022-06-30 NOTE — ED NOTES
"[facility] ED Handoff Report    ED Chief Complaint: Shortness of breath; abdominal pain    ED Diagnosis:  (R06.00) BURROUGHS (dyspnea on exertion)    (R06.01) Orthopnea    (R06.00) Dyspnea, unspecified type    (I16.0) Hypertensive urgency       PMH:  No past medical history on file.     Code Status:  Full Code     Falls Risk: Yes     Current Living Situation/Residence: lives alone in [facility]    Elimination Status: Continent: Yes but wears brief    Activity Level: SBA w/ walker (normally uses cane at home)    Patients Preferred Language:  English     Needed: No    Vital Signs:  BP (!) 172/106   Pulse 81   Temp 98.8  F (37.1  C) (Oral)   Resp 22   Ht 1.448 m (4' 9\")   Wt 98.9 kg (218 lb)   SpO2 94%   BMI 47.17 kg/m       Cardiac Rhythm: NSR    Pain Score: denies any pain at this time in chest or abdomen; does endorse some new right foot pain    Is the Patient Confused:  No    Last Food or Drink: 06/30/22 at currently eating    Focused Assessment:  Pt came into ER last night with right sided abdominal pain and nausea, both are gone at this time. She was also having audible wheezing and using accessory muscles. She had rales and diminished breath sounds. Increased shortness of breath over the last two days. She has dyspnea on exertion and orthopnea. She states her shortness of breath has now significantly improved and no distress or increased work of breathing is noted at this time. She was also hypertensive on arrival with a SBP over 200, now down to 140s.     Tests Performed: Done: Labs and Imaging    Treatments Provided:  Medications    Family Dynamics/Concerns: No    Family Updated On Visitor Policy: No    Plan of Care Communicated to Family: No    Who Was Updated about Plan of Care: Pt stated that she did not want nursing to call anyone and that she updated a friend of her by herself.     Belongings Checklist Done and Signed by Patient: Yes    Covid: asymptomatic , negative    Additional " Information: Pt is having nuc med stress test tomorrow morning at 0745. She is to be NPO starting tonight at midnight and no caffeine after 1900. Pt was initially on 2LPM of oxygen by nasal cannula. She was taken off oxygen and saturations went down to high 80s. She was placed back on 1 LPM by nasal cannula and saturations improved. Still needs US Abdomen as well.    RN: Kenna Fleming RN   6/30/2022 4:15 PM

## 2022-06-30 NOTE — PROGRESS NOTES
06/30/22 1445   Quick Adds   Type of Visit Initial Occupational Therapy Evaluation   Living Environment   People in Home alone   Current Living Arrangements other (see comments)  (town house)   Living Environment Comments tub shower, high toilet   Self-Care   Equipment Currently Used at Home cane, straight;shower chair  (uses carts at stores for support)   Fall history within last six months yes   Number of times patient has fallen within last six months 2  (lost balance, shoe got caught)   Activity/Exercise/Self-Care Comment ind for BADLs   Instrumental Activities of Daily Living (IADL)   IADL Comments ind for IADLs, drives, meds   General Information   Onset of Illness/Injury or Date of Surgery 06/29/22   Referring Physician Matthew Lynn DO   Patient/Family Therapy Goal Statement (OT) go home   Additional Occupational Profile Info/Pertinent History of Current Problem admitted with dyspnea   Existing Precautions/Restrictions no known precautions/restrictions   General Observations and Info pt states wheezing noted during activity is baseline. on 2L O2, doesn't use O2 at baseline   Cognitive Status Examination   Orientation Status orientation to person, place and time   Pain Assessment   Patient Currently in Pain No   Range of Motion Comprehensive   General Range of Motion no range of motion deficits identified   Strength Comprehensive (MMT)   Comment, General Manual Muscle Testing (MMT) Assessment WFL   Bed Mobility   Bed Mobility No deficits identified   Transfers   Transfers No deficits identified   Balance   Balance Assessment no deficits were identified   Balance Comments fatigue noted with back-to-back PT / OT session and prolonged activity   Activities of Daily Living   BADL Assessment/Intervention lower body dressing;bathing;other (see comments)  (anticipate pt will require increased assist for prolonged activity at this time including meal prep, cleaning, and grocery shopping.)   Bathing  Assessment/Intervention   [place] Level (Bathing) not tested   Comment, (Bathing) anticipate SBA d/t current impaired activity tolerance noted during short bouts of activity.   Lower Body Dressing Assessment/Training   [place] Level (Lower Body Dressing) independent   Clinical Impression   Criteria for Skilled Therapeutic Interventions Met (OT) Yes, treatment indicated   OT Diagnosis dec ADL indep   OT Problem List-Impairments impacting ADL problems related to;activity tolerance impaired   Assessment of Occupational Performance 1-3 Performance Deficits   Identified Performance Deficits meal prep, household management, bathing   Planned Therapy Interventions (OT) ADL retraining   Clinical Decision Making Complexity (OT) low complexity   Risk & Benefits of therapy have been explained evaluation/treatment results reviewed;participants included;patient   OT Discharge Planning   OT Discharge Recommendation (DC Rec) home   OT Rationale for DC Rec pt at baseline for ADLs. would benefit from continued intervention with PT to address activity tolerance and increase independence in IADLs   Therapy Certification   Start of Care Date 06/30/22   Certification date from 06/30/22   Certification date to 07/07/22   Medical Diagnosis dyspnea   Total Evaluation Time (Minutes)   Total Evaluation Time (Minutes) 10   OT Goals   Therapy Frequency (OT) One time eval and treatment   OT Predicted Duration/Target Date for Goal Attainment 06/30/22   OT Goals OT Goal 1   OT: Goal 1 Pt will verbalize and display understanding of energy conservation techniques to increase safety during participation in ADLs and IADLs.  (goal met)

## 2022-06-30 NOTE — ED NOTES
Dr. Lynn came and saw pt.  Plan is to do abdominal US and might do stress test tomorrow based on pending Echo results

## 2022-06-30 NOTE — ED NOTES
Pt unable to tolerate oxygen at RA and O2 sats decreased to 89% without exertion.  Hooked back to O2 at 1 LPM per NC and now 92-93% resting

## 2022-06-30 NOTE — ED TRIAGE NOTES
Pt comes in with a few days of nausea, abdominal pain, dry heaving. Pt short of breath after short walk, says that has been for years. Audible wheezes in triage.

## 2022-06-30 NOTE — H&P
[facility] History and Physical       A/P    Acute on chronic dyspnea in the setting of chronic lymphedema: chronic exertional dyspnea, worse over past few months, no chest pain.  BNP mildly elevated at 155.  No evidence for pleural effusion or pulmonary edema on CT chest.'s given the markedly hypertensive underlying hypertensive cardiomyopathy and diastolic dysfunction possible.  No recent echocardiogram on file.  Obtain cardiac echo.  Serial troponin enzymes. Stress test in a.m. if TTE and trops neg. Given moderate coronary artery calcifications noted on CT as BURROUGHS could be anginal equivalent.  Consider additional Lasix pending TTE.    Mild transaminitis, RUQ pain, N/V: Possibly related to problem #1.  Trend.  -RUQ US  -lft a.m.    Chronic lymphedema: has wraps at home. Advised compliance.    Hypertensive urgency in the setting of chronic essential hypertension: hasn't taken meds since 6/28 driving some of elevated BP currently.  -Hydralazine IV as needed for now.  - lisinopril and atenolol  -stop nitroglycerin patch    Dysuria:  -check ua/uc    H/O gastritis:  EGD 12/2021 showed possible scarring and moderate duodenal stenosis and sweep that may suggest prior healed ulcer.  Gastric erythema with superficial erosion.  LA grade B esophagitis.  Duodenal biopsy second portion unremarkable, duodenal bulb biopsy chronic peptic duodenitis, stomach, body and antrum, reactive gastropathy and negative for H. pylori.  Advised to be on PPI once daily indefinitely in conjunction w/ calcium supplementation but does not appear to be taking regularly.  -start protonix 40mg every day  -start calcium supplementation on discharge    History of colon polyps: Colonoscopy on 12/2021 showed colonic polyps which were resected and pathology from transverse/descending polypectomy showed tubular adenoma fragments.      Full code    covid neg 6/29    discharge possibly tomorrow pending LFT's, BP control, stress test               Chief Complaint:     SOB     HPI:    71 year old female with a past medical history of hypertension, hyperlipidemia, and lymphedema who presents for shortness of breath.  However, per triage note, patient with 2 days (6/27/2022) of nausea, dry heaving, and abdominal pain. Per patient, she has felt exertional shortness of breath for the past couple years. States she cannot walk 50 feet without feeling short of breath. Reports she feels better sitting up than laying down. She endorses having chronic lower extremity edema. She reports a history of hypertension and is taking 2 blood pressure medications which she takes regularly. States her normal blood pressure is 175.  Presenting blood pressure 213/97 with improvement down to most recent of 144/63 following IV Lasix and nitro patch placement.  Heart rate 60 to 80s.  T-max 99.2.  SPO2 91% on room air on admission.  Currently 99% on 2 L.  EKG on admission showed no acute ST elevation/depression suggestive of acute ischemic event.  Initial troponin negative.  COVID-19 negative.  BN P1 55.  Creatinine 1.2, AST 84, ALT 49 with remainder of complete metabolic panel normal.  INR 1.02.  CBC normal.  CT angiogram of the chest negative for pulmonary emboli, platelike atelectasis lower lungs, no adenopathy or effusion, moderate coronary artery calcifications, no pericardial effusion, normal cardiac size, normal thoracic aorta without aneurysm or dissection.  Patient admitted for observation and additional work-up/evaluation for above presenting symptoms.         Past Medical History:   HTN  HLD  Lymphedema         Past Surgical History:      Past Surgical History:   Procedure Laterality Date     OTHER SURGICAL HISTORY  2012    left TKArevision in 2013     RELEASE CARPAL TUNNEL Bilateral 1990     RIGHT OOPHORECTOMY               Social History:     Social History     Tobacco Use     Smoking status: Never Smoker     Smokeless tobacco: Not on file   Substance Use Topics      "Alcohol use: Yes     Comment: Alcoholic Drinks/day: rarely             Family History:   No family history on file.  Family history reviewed and updated in EPIC            Allergies:   No Known Allergies          Medications:   reviewed         Review of Systems:     The Review of Systems is negative other than noted in the HPI      BP (!) 146/84   Pulse 64   Temp 98.6  F (37  C) (Oral)   Resp 28   Ht 1.448 m (4' 9\")   Wt 98.9 kg (218 lb)   SpO2 99%   BMI 47.17 kg/m     Physical Examination:   General appearance - alert, and in no distress  Eyes - pupils equal and reactive, extraocular eye movements intact, sclera anicteric  Mouth - mucous membranes moist, pharynx normal without lesions  Lungs - clear to auscultation, no wheezes, rales or rhonchi, symmetric air entry  Heart - normal rate, regular rhythm, normal S1, S2, no murmurs, rubs, clicks or gallops. chronic peripheral edema.  Abdomen - soft, mild ruq ttp, nondistended, no masses or organomegaly, BS+  Neurological - alert, oriented, normal speech, no focal findings or movement disorder noted  Skin - no c/c/p                Data:   Lab/imaging studies reviewed    ECG from admission personally reviewed.  NSR. No acute ischemic changes.          Matthew Lynn, DO, DO    "

## 2022-07-01 ENCOUNTER — APPOINTMENT (OUTPATIENT)
Dept: NUCLEAR MEDICINE | Facility: HOSPITAL | Age: 72
End: 2022-07-01
Attending: INTERNAL MEDICINE
Payer: COMMERCIAL

## 2022-07-01 ENCOUNTER — APPOINTMENT (OUTPATIENT)
Dept: ULTRASOUND IMAGING | Facility: HOSPITAL | Age: 72
End: 2022-07-01
Attending: INTERNAL MEDICINE
Payer: COMMERCIAL

## 2022-07-01 ENCOUNTER — APPOINTMENT (OUTPATIENT)
Dept: CARDIOLOGY | Facility: HOSPITAL | Age: 72
End: 2022-07-01
Attending: INTERNAL MEDICINE
Payer: COMMERCIAL

## 2022-07-01 ENCOUNTER — APPOINTMENT (OUTPATIENT)
Dept: PHYSICAL THERAPY | Facility: HOSPITAL | Age: 72
End: 2022-07-01
Payer: COMMERCIAL

## 2022-07-01 VITALS
HEART RATE: 55 BPM | TEMPERATURE: 98.5 F | OXYGEN SATURATION: 91 % | BODY MASS INDEX: 49.6 KG/M2 | SYSTOLIC BLOOD PRESSURE: 109 MMHG | DIASTOLIC BLOOD PRESSURE: 59 MMHG | RESPIRATION RATE: 18 BRPM | WEIGHT: 229.9 LBS | HEIGHT: 57 IN

## 2022-07-01 LAB
ALBUMIN SERPL-MCNC: 2.9 G/DL (ref 3.5–5)
ALP SERPL-CCNC: 86 U/L (ref 45–120)
ALT SERPL W P-5'-P-CCNC: 41 U/L (ref 0–45)
ANION GAP SERPL CALCULATED.3IONS-SCNC: 6 MMOL/L (ref 5–18)
AST SERPL W P-5'-P-CCNC: 52 U/L (ref 0–40)
BASOPHILS # BLD MANUAL: 0 10E3/UL (ref 0–0.2)
BASOPHILS NFR BLD MANUAL: 0 %
BILIRUB SERPL-MCNC: 0.3 MG/DL (ref 0–1)
BUN SERPL-MCNC: 18 MG/DL (ref 8–28)
CALCIUM SERPL-MCNC: 8.4 MG/DL (ref 8.5–10.5)
CHLORIDE BLD-SCNC: 103 MMOL/L (ref 98–107)
CO2 SERPL-SCNC: 26 MMOL/L (ref 22–31)
CREAT SERPL-MCNC: 0.98 MG/DL (ref 0.6–1.1)
CV STRESS CURRENT BP HE: NORMAL
CV STRESS CURRENT HR HE: 64
CV STRESS CURRENT HR HE: 65
CV STRESS CURRENT HR HE: 68
CV STRESS CURRENT HR HE: 70
CV STRESS CURRENT HR HE: 71
CV STRESS CURRENT HR HE: 72
CV STRESS CURRENT HR HE: 73
CV STRESS CURRENT HR HE: 74
CV STRESS CURRENT HR HE: 75
CV STRESS CURRENT HR HE: 75
CV STRESS CURRENT HR HE: 81
CV STRESS CURRENT HR HE: 82
CV STRESS CURRENT HR HE: 83
CV STRESS CURRENT HR HE: 84
CV STRESS CURRENT HR HE: 86
CV STRESS CURRENT HR HE: 86
CV STRESS CURRENT HR HE: 87
CV STRESS CURRENT HR HE: 88
CV STRESS CURRENT HR HE: 93
CV STRESS DEVIATION TIME HE: NORMAL
CV STRESS ECHO PERCENT HR HE: NORMAL
CV STRESS EXERCISE STAGE HE: NORMAL
CV STRESS FINAL RESTING BP HE: NORMAL
CV STRESS FINAL RESTING HR HE: 71
CV STRESS MAX HR HE: 93
CV STRESS MAX TREADMILL GRADE HE: 0
CV STRESS MAX TREADMILL SPEED HE: 0
CV STRESS PEAK DIA BP HE: NORMAL
CV STRESS PEAK SYS BP HE: NORMAL
CV STRESS PHASE HE: NORMAL
CV STRESS PROTOCOL HE: NORMAL
CV STRESS RESTING PT POSITION HE: NORMAL
CV STRESS ST DEVIATION AMOUNT HE: NORMAL
CV STRESS ST DEVIATION ELEVATION HE: NORMAL
CV STRESS ST EVELATION AMOUNT HE: NORMAL
CV STRESS TEST TYPE HE: NORMAL
CV STRESS TOTAL STAGE TIME MIN 1 HE: NORMAL
EOSINOPHIL # BLD MANUAL: 0 10E3/UL (ref 0–0.7)
EOSINOPHIL NFR BLD MANUAL: 0 %
ERYTHROCYTE [DISTWIDTH] IN BLOOD BY AUTOMATED COUNT: 13.1 % (ref 10–15)
GFR SERPL CREATININE-BSD FRML MDRD: 62 ML/MIN/1.73M2
GLUCOSE BLD-MCNC: 117 MG/DL (ref 70–125)
HCT VFR BLD AUTO: 37.4 % (ref 35–47)
HGB BLD-MCNC: 11.9 G/DL (ref 11.7–15.7)
LYMPHOCYTES # BLD MANUAL: 1.3 10E3/UL (ref 0.8–5.3)
LYMPHOCYTES NFR BLD MANUAL: 25 %
MCH RBC QN AUTO: 30.7 PG (ref 26.5–33)
MCHC RBC AUTO-ENTMCNC: 31.8 G/DL (ref 31.5–36.5)
MCV RBC AUTO: 96 FL (ref 78–100)
MONOCYTES # BLD MANUAL: 0.3 10E3/UL (ref 0–1.3)
MONOCYTES NFR BLD MANUAL: 5 %
NEUTROPHILS # BLD MANUAL: 3.7 10E3/UL (ref 1.6–8.3)
NEUTROPHILS NFR BLD MANUAL: 70 %
PLAT MORPH BLD: NORMAL
PLATELET # BLD AUTO: 169 10E3/UL (ref 150–450)
POTASSIUM BLD-SCNC: 4.1 MMOL/L (ref 3.5–5)
PROT SERPL-MCNC: 6.4 G/DL (ref 6–8)
RATE PRESSURE PRODUCT: NORMAL
RBC # BLD AUTO: 3.88 10E6/UL (ref 3.8–5.2)
RBC MORPH BLD: NORMAL
SODIUM SERPL-SCNC: 135 MMOL/L (ref 136–145)
STRESS ECHO BASELINE DIASTOLIC HE: 86
STRESS ECHO BASELINE HR: 61
STRESS ECHO BASELINE SYSTOLIC BP: 176
STRESS ECHO CALCULATED PERCENT HR: 62 %
STRESS ECHO LAST STRESS DIASTOLIC BP: 72
STRESS ECHO LAST STRESS HR: 82
STRESS ECHO LAST STRESS SYSTOLIC BP: 137
STRESS ECHO TARGET HR: 150
WBC # BLD AUTO: 5.3 10E3/UL (ref 4–11)

## 2022-07-01 PROCEDURE — 250N000013 HC RX MED GY IP 250 OP 250 PS 637: Performed by: INTERNAL MEDICINE

## 2022-07-01 PROCEDURE — 97116 GAIT TRAINING THERAPY: CPT | Mod: GP | Performed by: PHYSICAL THERAPIST

## 2022-07-01 PROCEDURE — 76705 ECHO EXAM OF ABDOMEN: CPT

## 2022-07-01 PROCEDURE — G0378 HOSPITAL OBSERVATION PER HR: HCPCS

## 2022-07-01 PROCEDURE — 36415 COLL VENOUS BLD VENIPUNCTURE: CPT | Performed by: INTERNAL MEDICINE

## 2022-07-01 PROCEDURE — 78452 HT MUSCLE IMAGE SPECT MULT: CPT | Mod: 26 | Performed by: INTERNAL MEDICINE

## 2022-07-01 PROCEDURE — 85018 HEMOGLOBIN: CPT | Performed by: INTERNAL MEDICINE

## 2022-07-01 PROCEDURE — 93018 CV STRESS TEST I&R ONLY: CPT | Performed by: INTERNAL MEDICINE

## 2022-07-01 PROCEDURE — 85007 BL SMEAR W/DIFF WBC COUNT: CPT | Performed by: INTERNAL MEDICINE

## 2022-07-01 PROCEDURE — 93016 CV STRESS TEST SUPVJ ONLY: CPT | Performed by: INTERNAL MEDICINE

## 2022-07-01 PROCEDURE — 93017 CV STRESS TEST TRACING ONLY: CPT | Performed by: INTERNAL MEDICINE

## 2022-07-01 PROCEDURE — 250N000011 HC RX IP 250 OP 636: Performed by: INTERNAL MEDICINE

## 2022-07-01 PROCEDURE — 93017 CV STRESS TEST TRACING ONLY: CPT

## 2022-07-01 PROCEDURE — 343N000001 HC RX 343: Performed by: INTERNAL MEDICINE

## 2022-07-01 PROCEDURE — 78452 HT MUSCLE IMAGE SPECT MULT: CPT

## 2022-07-01 PROCEDURE — 80053 COMPREHEN METABOLIC PANEL: CPT | Performed by: INTERNAL MEDICINE

## 2022-07-01 PROCEDURE — 99217 PR OBSERVATION CARE DISCHARGE: CPT | Performed by: INTERNAL MEDICINE

## 2022-07-01 PROCEDURE — A9500 TC99M SESTAMIBI: HCPCS | Performed by: INTERNAL MEDICINE

## 2022-07-01 RX ORDER — ACETAMINOPHEN 325 MG/1
650 TABLET ORAL EVERY 4 HOURS PRN
Status: DISCONTINUED | OUTPATIENT
Start: 2022-07-01 | End: 2022-07-01 | Stop reason: HOSPADM

## 2022-07-01 RX ORDER — AMINOPHYLLINE 25 MG/ML
50 INJECTION, SOLUTION INTRAVENOUS
Status: ACTIVE | OUTPATIENT
Start: 2022-07-01 | End: 2022-07-01

## 2022-07-01 RX ORDER — PANTOPRAZOLE SODIUM 40 MG/1
40 TABLET, DELAYED RELEASE ORAL
Qty: 30 TABLET | Refills: 0 | Status: SHIPPED | OUTPATIENT
Start: 2022-07-02

## 2022-07-01 RX ORDER — REGADENOSON 0.08 MG/ML
0.4 INJECTION, SOLUTION INTRAVENOUS ONCE
Status: COMPLETED | OUTPATIENT
Start: 2022-07-01 | End: 2022-07-01

## 2022-07-01 RX ADMIN — LISINOPRIL 10 MG: 5 TABLET ORAL at 09:28

## 2022-07-01 RX ADMIN — Medication 34.5 MILLICURIE: at 12:17

## 2022-07-01 RX ADMIN — PANTOPRAZOLE SODIUM 40 MG: 40 TABLET, DELAYED RELEASE ORAL at 09:27

## 2022-07-01 RX ADMIN — Medication 8.31 MCI.: at 07:57

## 2022-07-01 RX ADMIN — REGADENOSON 0.4 MG: 0.08 INJECTION, SOLUTION INTRAVENOUS at 08:46

## 2022-07-01 RX ADMIN — AMINOPHYLLINE 50 MG: 25 INJECTION, SOLUTION INTRAVENOUS at 08:50

## 2022-07-01 RX ADMIN — ATENOLOL 25 MG: 25 TABLET ORAL at 09:27

## 2022-07-01 NOTE — DISCHARGE SUMMARY
[facility] MEDICINE  DISCHARGE SUMMARY     Primary Care Physician: Damion Shea  Admission Date: 6/29/2022   Discharge Provider: Matthew yLnn DO, DO Discharge Date: 7/1/2022   Diet:   Active Diet and Nourishment Order   Procedures     No Caffeine Diet     Diet       Code Status: Full Code   Activity: DCACTIVITY: Activity as tolerated        Condition at Discharge: Stable     REASON FOR PRESENTATION(See Admission Note for Details)   Refer to h&p    PRINCIPAL & ACTIVE DISCHARGE DIAGNOSES     Active Problems:    Orthopnea    BURROUGHS (dyspnea on exertion)    Hypertensive urgency    Dyspnea, unspecified type      PENDING LABS     Unresulted Labs Ordered in the Past 30 Days of this Admission     No orders found from 5/30/2022 to 6/30/2022.            PROCEDURES ( this hospitalization only)          RECOMMENDATIONS TO OUTPATIENT PROVIDER FOR F/U VISIT     Follow-up Appointments     Follow-up and recommended labs and tests       Follow up with primary care provider, DAMION SHEA, within 7 days for   hospital follow- up.  No follow up labs or test are needed.                 DISPOSITION     Home    SUMMARY OF HOSPITAL COURSE:    Acute on chronic dyspnea in the setting of chronic lymphedema: chronic exertional dyspnea, worse over past few months, no chest pain.  BNP mildly elevated at 155.  No evidence for pleural effusion or pulmonary edema on CT chest.  TTE showed LVEF 60-65%, normal RV size/fxn, RAP ~ 3mmHg, No hemodynamically significant valve dz.  Serial troponins negative.  Nuclear cardiac stress test negative for ischemia/infarction.  Consider outpatient sleep study to r/o LUX as well as complete PFT's.  Defer to PCP.     Mild transaminitis, RUQ pain, N/V: resolved.  RUQ US unremarkable for acute process. LFT's nearly normal on discharge.  Tolerating PO well.     Chronic lymphedema: has wraps at home. Advised compliance.     Hypertensive urgency in the setting of chronic  essential hypertension: due to not taking meds since 6/28  - resumed lisinopril and atenolol. Better control now on discharge.     Dysuria:  -UA unremarkable     H/O gastritis:  EGD 12/2021 showed possible scarring and moderate duodenal stenosis and sweep that may suggest prior healed ulcer.  Gastric erythema with superficial erosion.  LA grade B esophagitis.  Duodenal biopsy second portion unremarkable, duodenal bulb biopsy chronic peptic duodenitis, stomach, body and antrum, reactive gastropathy and negative for H. pylori.  Advised to be on PPI once daily indefinitely in conjunction w/ calcium supplementation but not taking regularly for several months.  -start protonix 40mg every day  -Recommend calcium supplementation      History of colon polyps: Colonoscopy on 12/2021 showed colonic polyps which were resected and pathology from transverse/descending polypectomy showed tubular adenoma fragments.       Full code     covid neg 6/29    Discharge Medications with Med changes:     Current Discharge Medication List      START taking these medications    Details   pantoprazole (PROTONIX) 40 MG EC tablet Take 1 tablet (40 mg) by mouth every morning (before breakfast)  Qty: 30 tablet, Refills: 0    Associated Diagnoses: Gastroesophageal reflux disease with esophagitis, unspecified whether hemorrhage         CONTINUE these medications which have NOT CHANGED    Details   atenolol (TENORMIN) 25 MG tablet Take 25 mg by mouth daily      capsicum (ZOSTRIX) 0.075 % topical cream [CAPSICUM (ZOSTRIX) 0.075 % TOPICAL CREAM] Apply 1 application topically 2 (two) times a day as needed.      gabapentin (NEURONTIN) 300 MG capsule [GABAPENTIN (NEURONTIN) 300 MG CAPSULE] Take 300 mg by mouth bedtime as needed.      lisinopril (ZESTRIL) 10 MG tablet Take 10 mg by mouth daily      MULTIVITAMIN (MULTIPLE VITAMINS ORAL) [MULTIVITAMIN (MULTIPLE VITAMINS ORAL)] Take by mouth. 1 packet of pills daily po      nystatin (MYCOSTATIN) powder  [NYSTATIN (MYCOSTATIN) POWDER] Apply topically 2 (two) times a day as needed.       traMADol (ULTRAM) 50 MG tablet Take 50 mg by mouth every 6 hours as needed for severe pain      simvastatin (ZOCOR) 20 MG tablet [SIMVASTATIN (ZOCOR) 20 MG TABLET] Take 20 mg by mouth bedtime.                   Rationale for medication changes:              Consults       CARE MANAGEMENT / SOCIAL WORK IP CONSULT  NUTRITION SERVICES ADULT IP CONSULT  PHYSICAL THERAPY ADULT IP CONSULT  OCCUPATIONAL THERAPY ADULT IP CONSULT  CARE MANAGEMENT / SOCIAL WORK IP CONSULT    Immunizations given this encounter     Most Recent Immunizations   Administered Date(s) Administered     COVID-19,PF,Moderna 04/30/2021     Flu, Unspecified 09/24/2014           Anticoagulation Information            SIGNIFICANT IMAGING FINDINGS     Results for orders placed or performed during the hospital encounter of 06/29/22   XR Chest Port 1 View    Impression    IMPRESSION: Small right basilar infiltrate may be pneumonia.   CT Chest Pulmonary Embolism w Contrast    Impression    IMPRESSION:  1.  No pulmonary emboli on either side. Benign nodule in each upper lobe, no specific follow-up required. Platelike atelectasis in the lower lungs, improved on the left. No adenopathy or effusion.    2.  Normal cardiac size. Moderate coronary artery calcifications. No pericardial effusion. Normal caliber thoracic aorta without aneurysm or dissection.    3.  Degenerative changes both shoulders and the spine.   US Abdomen Limited    Impression    IMPRESSION:  Normal limited abdominal ultrasound.       Echocardiogram Complete   Result Value Ref Range    LVEF  60-65%    NM Lexiscan stress test (nuc card)   Result Value Ref Range    Pharmacologic Protocol Lexiscan     Test Type Pharmacological     Baseline HR 61     Baseline Systolic      Baseline Diastolic BP 86     Last Stress HR 82     Last Stress Systolic      Last Stress Diastolic BP 72     Target      PERCENT  HR 85%     ST Deviation Elevation III 0.1mm     Deviation Time I -0.2mm     ST Elevation Amount aVR 0.3mm     ST Deviation Amount he II -0.5mm     Final Resting /83     Final Resting HR 71     Max Treadmill Speed 0.0     Max Treadmill Grade 0.0     Peak Systolic /83     Peak Diastolic /95     Max HR  93     Stress Phase Resting     Stress Resting Pt Position MANUAL EVENT     Current HR 86     Current /90     Stress Phase Stress     Stage Minute EXE 00:00     Exercise Stage STAGE 2     Current HR 64     Current /86     Stress Phase Stress     Stage Minute EXE 00:45     Exercise Stage STAGE 2     Current HR 68     Current /86     Stress Phase Stress     Stage Minute EXE 01:00     Exercise Stage STAGE 3     Current HR 65     Current /86     Stress Phase Stress     Stage Minute EXE 01:17     Exercise Stage STAGE 3     Current HR 74     Current /86     Stress Phase Stress     Stage Minute EXE 02:00     Exercise Stage STAGE 4     Current HR 93     Current /86     Stress Phase Stress     Stage Minute EXE 02:47     Exercise Stage STAGE 4     Current HR 88     Current /72     Stress Phase Stress     Stage Minute EXE 03:00     Exercise Stage STAGE 5     Current HR 87     Current /72     Stress Phase Stress     Stage Minute EXE 04:00     Exercise Stage STAGE 6     Current HR 81     Current /72     Stress Phase Stress     Stage Minute EXE 04:03     Exercise Stage STAGE 6     Current HR 82     Current /72     Stress Phase Recovery     Stage Minute REC 00:05     Exercise Stage Recovery     Current HR 83     Current /90     Stress Phase Recovery     Stage Minute REC 00:26     Exercise Stage Recovery     Current HR 86     Current /90     Stress Phase Recovery     Stage Minute REC 00:56     Exercise Stage Recovery     Current HR 84     Current /90     Stress Phase Recovery     Stage Minute REC 01:56     Exercise Stage Recovery     Current  HR 75     Current /90     Stress Phase Recovery     Stage Minute REC 02:09     Exercise Stage Recovery     Current HR 75     Current /95     Stress Phase Recovery     Stage Minute REC 02:56     Exercise Stage Recovery     Current HR 73     Current /95     Stress Phase Recovery     Stage Minute REC 03:11     Exercise Stage Recovery     Current HR 72     Current /83     Stress Phase Recovery     Stage Minute REC 03:56     Exercise Stage Recovery     Current HR 70     Current /83     Stress Phase Recovery     Stage Minute REC 04:00     Exercise Stage Recovery     Current HR 71     Current /83     Max Predicted HR  62 %    Rate Pressure Product 12,741.0        SIGNIFICANT LABORATORY FINDINGS       Discharge Orders        Reason for your hospital stay    Refer to h&p     Follow-up and recommended labs and tests     Follow up with primary care provider, SHIKHA SHEA, within 7 days for hospital follow- up.  No follow up labs or test are needed.     Activity    Your activity upon discharge: activity as tolerated     Diet    Follow this diet upon discharge: Orders Placed This Encounter      No Caffeine Diet       Examination   Physical Exam   Temp:  [97.8  F (36.6  C)-100.3  F (37.9  C)] 97.8  F (36.6  C)  Pulse:  [57-78] 57  Resp:  [18-30] 18  BP: (126-151)/(59-79) 126/68  SpO2:  [93 %-99 %] 95 %  Wt Readings from Last 1 Encounters:   07/01/22 104.3 kg (229 lb 14.4 oz)       gen nad  cv rrr  Lungs cta  abd bs+, nttp  Neuro nonfocal    Matthew Lynn DO,   [facility]    CC:Shikha Shea

## 2022-07-01 NOTE — PROGRESS NOTES
PRIMARY DIAGNOSIS: orthopnea   OUTPATIENT/OBSERVATION GOALS TO BE MET BEFORE DISCHARGE:  1. ADLs back to baseline: No    2. Activity and level of assistance: Up with standby assistance.    3. Pain status: Improved-controlled with oral pain medications.    4. Return to near baseline physical activity: No- pt still having BURROUGHS, SOB with activity     Discharge Planner Nurse   Safe discharge environment identified: Yes  Barriers to discharge: Yes- N.M. stress test in AM       Entered by: Kasey Joseph RN 06/30/2022 10:52 PM     Please review provider order for any additional goals.   Nurse to notify provider when observation goals have been met and patient is ready for discharge.

## 2022-07-01 NOTE — PROGRESS NOTES
Pt states has chronic BURROUGHS sx getting worse over the last couple of weeks.  CAD noted on CT scan.  BNP elevated and Troponin's  Negative x 3.  Keily So RN

## 2022-07-01 NOTE — PROGRESS NOTES
"PRIMARY DIAGNOSIS: \"GENERIC\" NURSING  OUTPATIENT/OBSERVATION GOALS TO BE MET BEFORE DISCHARGE:  1. ADLs back to baseline: Yes    2. Activity and level of assistance: Up with standby assistance.    3. Pain status: Pain free.    4. Return to near baseline physical activity: Yes     Discharge Planner Nurse   Safe discharge environment identified: Yes  Barriers to discharge: Yes       Entered by: Cydney Carranza RN 07/01/2022 11:03 AM   Patient is still completing stress test   Please review provider order for any additional goals.   Nurse to notify provider when observation goals have been met and patient is ready for discharge.  "

## 2022-07-01 NOTE — PROGRESS NOTES
PRIMARY DIAGNOSIS: orthopnea  OUTPATIENT/OBSERVATION GOALS TO BE MET BEFORE DISCHARGE:  1. ADLs back to baseline: No    2. Activity and level of assistance: Up with standby assistance.    3. Pain status: Improved-controlled with oral pain medications.    4. Return to near baseline physical activity: No- BURROUGHS, SOB      Discharge Planner Nurse   Safe discharge environment identified: Yes  Barriers to discharge: Yes- nuclear med stress test in AM       Entered by: Kasey Joseph RN 07/01/2022 4:11 AM     Please review provider order for any additional goals.   Nurse to notify provider when observation goals have been met and patient is ready for discharge.    Pt. NPO since 0000 for stress test this morning. Also has order for ABD US. Pt. Running a low grade temp 99.3-100.3 Complained of RLE pain. Received prn Tramadol X1. Pt. Up SBA. Still having BURROUGHS and on 2L NC. Lungs with some expiratory wheezes noted. Will continue to monitor.    Kasey Joseph RN

## 2022-07-01 NOTE — PROVIDER NOTIFICATION
Patient has been on room air since 1500. Patient's O2 sats are ranging from 89-92% on room air. MD notified.    Update: MD called RN back at 1604 and per MD patient is ok to discharge.     Cydney Carranza RN

## 2022-07-01 NOTE — PLAN OF CARE
Physical Therapy Discharge Summary    Reason for therapy discharge:    Discharged to home.    Progress towards therapy goal(s). See goals on Care Plan in Psychiatric electronic health record for goal details.  Goals partially met.  Barriers to achieving goals:   discharge from facility.    Therapy recommendation(s):    No further therapy is recommended.      Yaquelin Lewis, PT  7/1/2022

## 2022-07-01 NOTE — UTILIZATION REVIEW
Admission Status; Secondary Review Determination     Under the authority of the Utilization Management Committee, the utilization review process indicated a secondary review on the above patient.  The review outcome is based on review of the medical records, discussions with staff, and applying clinical experience noted on the date of the review.       (x) Observation Status Appropriate      RATIONALE FOR DETERMINATION    SUMMARY:  72 yr old female presented with acute on chronic dyspnea with chronic lymphedema and worsening chronic exertional dyspnea with chest pain.  Troponins normal range.  Stress test negative for inducible ischemia.  Also had ABD US negative for pain source.   Some hypertensive urgency initially but BP improved today.  Has a borderline fever but no overt new infection documented and is not on IV antibiotics.    The severity of illness, intensity of service provided, expected LOS and risk for adverse outcome make the care appropriate for observation.     The information on this document is developed by the utilization review team in order for the business office to ensure compliance.  This only denotes the appropriateness of proper admission status and does not reflect the quality of care rendered.       The definitions of Inpatient Status and Observation Status used in making the determination above are those provided in the CMS Coverage Manual, Chapter 1 and Chapter 6, section 70.4.     Sincerely,  Blaise Sosa DO, [facility]  Utilization Review  Physician Advisor

## 2022-07-01 NOTE — PLAN OF CARE
Patient discharged at this time. Patient is alert and oriented, all discharge instructions and medications reviewed with patient prior to discharge. Patient provided with her filled protonix prescription prior to discontinue. All questions  answered. Patient denies pain or discomfort at time of discharge.    Problem: Plan of Care - These are the overarching goals to be used throughout the patient stay.    Goal: Absence of Hospital-Acquired Illness or Injury  Intervention: Identify and Manage Fall Risk  Recent Flowsheet Documentation  Taken 7/1/2022 1544 by Cydney Carranza RN  Safety Promotion/Fall Prevention:   room door open   nonskid shoes/slippers when out of bed  Taken 7/1/2022 1200 by Cydney Carranza RN  Safety Promotion/Fall Prevention:   room door open   nonskid shoes/slippers when out of bed  Taken 7/1/2022 0745 by Cydney Carranza RN  Safety Promotion/Fall Prevention:   room door open   nonskid shoes/slippers when out of bed  Intervention: Prevent and Manage VTE (Venous Thromboembolism) Risk  Recent Flowsheet Documentation  Taken 7/1/2022 1544 by Cydney Carranza RN  Activity Management:   ambulated in room   up in chair  Taken 7/1/2022 1422 by Cydney Carranza RN  Activity Management: ambulated in burgess  Taken 7/1/2022 1200 by Cydney Carranza RN  Activity Management:   ambulated in room   up in chair  Taken 7/1/2022 0745 by Cydney Carranza RN  Activity Management:   ambulated in room   up in chair   Goal Outcome Evaluation:

## 2022-07-01 NOTE — CONSULTS
"Care Management Discharge Note    Discharge Date: 07/01/2022       Discharge Disposition:  Home    Discharge Services:  OP PT    Discharge DME:  Per treatment team    Discharge Transportation:  Family    Education Provided on the Discharge Plan: yes   Persons Notified of Discharge Plans: patient  Patient/Family in Agreement with the Plan:  yes    Handoff Referral Completed: Yes    Additional Information:  SW met with Pt to discuss discharge planning. Pt lives alone and reports that she does not have any family nearby. When asked if she had any social supports, Pt reported she has a couple friends that could assist her, if needed. Pt states she is independent at baseline. SW informed Pt of PT recommendation for home PT. Pt said she would be agreeable to having home care. Pt added that she would need time to clean her house before she wants services as \"no one would be able to walk inside\" due to her number of belongings. SW informed Pt that the agency would call and she would be able to schedule appointments for when she is available.     Referral sent to [facility]. SELENA called and spoke to Vaishnavi at [facility] to follow up on referral. Agency declined due to capacity in Pt's area.    SELENA met with Pt and discussed OP PT. Pt said she preferred OP. SW provided a list of OP clinics in the area. No further CM identified.    KEVIN Velázquez        "

## 2022-07-05 NOTE — PROGRESS NOTES
[facility]      OUTPATIENT PHYSICAL THERAPY EVALUATION  PLAN OF TREATMENT FOR OUTPATIENT REHABILITATION  (COMPLETE FOR INITIAL CLAIMS ONLY)  Patient's Last Name, First Name, M.I.  YOB: 1950  Bonnie Hill                        Provider's Name  [facility] Medical Record No.  [id number]                               Onset Date:  06/30/22   Start of Care Date:  06/30/22      Type:     _X_PT   ___OT   ___SLP Medical Diagnosis:  (P) dyspnea on exertion                        PT Diagnosis:  impaired functional mobility   Visits from SOC:  1   _________________________________________________________________________________  Plan of Treatment/Functional Goals    Planned Interventions: balance training, bed mobility training, gait training, home exercise program, neuromuscular re-education, patient/family education, strengthening, transfer training     Goals: See Physical Therapy Goals on Care Plan in [employer] electronic health record.    Therapy Frequency: Daily  Predicted Duration of Therapy Intervention: 07/07/22  _________________________________________________________________________________    I CERTIFY THE NEED FOR THESE SERVICES FURNISHED UNDER        THIS PLAN OF TREATMENT AND WHILE UNDER MY CARE     (Physician co-signature of this document indicates review and certification of the therapy plan).              Certification date from: 06/30/22, Certification date to: 07/07/22    Referring Physician: Matthew Lynn,             Initial Assessment        See Physical Therapy evaluation dated 06/30/22 in Epic electronic health record.

## 2022-07-05 NOTE — PLAN OF CARE
[facility]      OUTPATIENT OCCUPATIONAL THERAPY  EVALUATION  PLAN OF TREATMENT FOR OUTPATIENT REHABILITATION  (COMPLETE FOR INITIAL CLAIMS ONLY)  Patient's Last Name, First Name, M.I.  YOB: 1950  Bonnie Hill                          Provider's Name  [facility] Medical Record No.  [id number]                               Onset Date:  06/29/22   Start of Care Date:  06/30/22     Type:     ___PT   _X_OT   ___SLP Medical Diagnosis:  dyspnea                        OT Diagnosis:  dec ADL indep   Visits from SOC:  1   _________________________________________________________________________________  Plan of Treatment/Functional Goals    Planned Interventions: ADL retraining   Goals: See Occupational Therapy Goals on Care Plan in [employer] electronic health record.    Therapy Frequency: One time eval and treatment  Predicted Duration of Therapy Intervention: 06/30/22  _________________________________________________________________________________    I CERTIFY THE NEED FOR THESE SERVICES FURNISHED UNDER        THIS PLAN OF TREATMENT AND WHILE UNDER MY CARE     (Physician co-signature of this document indicates review and certification of the therapy plan).                Certification date from: 06/30/22, Certification date to: 07/07/22    Referring Physician: Matthew Lynn,             Initial Assessment        See Occupational Therapy evaluation dated 06/30/22 in Epic electronic health record.